# Patient Record
Sex: FEMALE | Race: BLACK OR AFRICAN AMERICAN | NOT HISPANIC OR LATINO | URBAN - METROPOLITAN AREA
[De-identification: names, ages, dates, MRNs, and addresses within clinical notes are randomized per-mention and may not be internally consistent; named-entity substitution may affect disease eponyms.]

---

## 2019-01-24 ENCOUNTER — EMERGENCY (EMERGENCY)
Facility: HOSPITAL | Age: 24
LOS: 0 days | Discharge: HOME | End: 2019-01-24
Attending: EMERGENCY MEDICINE | Admitting: EMERGENCY MEDICINE

## 2019-01-24 VITALS
HEART RATE: 79 BPM | WEIGHT: 145.06 LBS | DIASTOLIC BLOOD PRESSURE: 65 MMHG | TEMPERATURE: 98 F | OXYGEN SATURATION: 100 % | RESPIRATION RATE: 18 BRPM | SYSTOLIC BLOOD PRESSURE: 113 MMHG

## 2019-01-24 DIAGNOSIS — Z79.1 LONG TERM (CURRENT) USE OF NON-STEROIDAL ANTI-INFLAMMATORIES (NSAID): ICD-10-CM

## 2019-01-24 DIAGNOSIS — R07.9 CHEST PAIN, UNSPECIFIED: ICD-10-CM

## 2019-01-24 DIAGNOSIS — F17.290 NICOTINE DEPENDENCE, OTHER TOBACCO PRODUCT, UNCOMPLICATED: ICD-10-CM

## 2019-01-24 DIAGNOSIS — R07.89 OTHER CHEST PAIN: ICD-10-CM

## 2019-01-24 RX ORDER — IBUPROFEN 200 MG
1 TABLET ORAL
Qty: 42 | Refills: 0 | OUTPATIENT
Start: 2019-01-24 | End: 2019-02-06

## 2019-01-24 RX ORDER — KETOROLAC TROMETHAMINE 30 MG/ML
30 SYRINGE (ML) INJECTION ONCE
Qty: 0 | Refills: 0 | Status: DISCONTINUED | OUTPATIENT
Start: 2019-01-24 | End: 2019-01-24

## 2019-01-24 RX ADMIN — Medication 30 MILLIGRAM(S): at 14:25

## 2019-01-24 NOTE — ED PROVIDER NOTE - ENMT, MLM
This is approximately the 5th or 6th additional amendment / Addendum and this is getting to be excessively onerous . I have yet again, reopened this gentleman's recent office visit and added an additional Addendum, can this be enough now ?    Shabbir Agustin MD     Airway patent, Nasal mucosa clear. Mouth with normal mucosa. Throat has no vesicles, no oropharyngeal exudates and uvula is midline. TM's clear bilaterally.

## 2019-01-24 NOTE — ED PROVIDER NOTE - ATTENDING CONTRIBUTION TO CARE
22 y/o female with no relevant PMHx presents to ED with left sided CP x 3 days.  Worse with movement.  No fever or chills.  No cough/SOB.  No vomiting/abdominal pain.  No family hx of CAD.  No hx of PE.  No smoking or OCPs.  PE:  agree with above.  A/P:  Chest Pain,  Check EKG/CXR and treat with NSAIDs.

## 2019-01-24 NOTE — ED PROVIDER NOTE - OBJECTIVE STATEMENT
23 y.o. female presented to the ER c/o constant Left sided chest pain for the past 3 days.  (+) URI last week which resolved.  Pain worse with movement. Pt denies fever, chills, cough, SOB, nausea, vomiting, diaphoresis, palpitations, recent travel, smoking, drug use, family h/o early heart disease/sudden cardiac death/congenital heart disease. States that she had borderline EKG that was done several years back.  Never followed up with cardiology "it was optional."

## 2019-01-24 NOTE — ED ADULT TRIAGE NOTE - CHIEF COMPLAINT QUOTE
L sided chest pain radiating to L shoulder and arm x 3 days . pt denies SOB. pt states had an abnormal EGK 2 years ago but never followed up with cardiologist. pt denied any medical hx

## 2019-01-24 NOTE — ED PROVIDER NOTE - MEDICAL DECISION MAKING DETAILS
NSAIDS prn; pt will follow up with PCP in 2-3 days; any new or worsening symptoms, pt will return to ER.   Note complete
